# Patient Record
Sex: FEMALE | URBAN - METROPOLITAN AREA
[De-identification: names, ages, dates, MRNs, and addresses within clinical notes are randomized per-mention and may not be internally consistent; named-entity substitution may affect disease eponyms.]

---

## 2019-12-04 ENCOUNTER — EMERGENCY (EMERGENCY)
Facility: HOSPITAL | Age: 59
LOS: 1 days | Discharge: HOME | End: 2019-12-04
Attending: EMERGENCY MEDICINE | Admitting: EMERGENCY MEDICINE
Payer: SELF-PAY

## 2019-12-04 VITALS
OXYGEN SATURATION: 98 % | RESPIRATION RATE: 18 BRPM | TEMPERATURE: 99 F | SYSTOLIC BLOOD PRESSURE: 145 MMHG | HEART RATE: 74 BPM | DIASTOLIC BLOOD PRESSURE: 92 MMHG

## 2019-12-04 VITALS
RESPIRATION RATE: 18 BRPM | SYSTOLIC BLOOD PRESSURE: 152 MMHG | DIASTOLIC BLOOD PRESSURE: 109 MMHG | OXYGEN SATURATION: 99 % | TEMPERATURE: 99 F | HEART RATE: 85 BPM

## 2019-12-04 PROCEDURE — 99283 EMERGENCY DEPT VISIT LOW MDM: CPT

## 2019-12-04 PROCEDURE — 73060 X-RAY EXAM OF HUMERUS: CPT | Mod: 26,RT

## 2019-12-04 PROCEDURE — 73030 X-RAY EXAM OF SHOULDER: CPT | Mod: 26,RT

## 2019-12-04 RX ORDER — ACETAMINOPHEN 500 MG
975 TABLET ORAL ONCE
Refills: 0 | Status: COMPLETED | OUTPATIENT
Start: 2019-12-04 | End: 2019-12-04

## 2019-12-04 RX ORDER — CLONAZEPAM 1 MG
0 TABLET ORAL
Qty: 0 | Refills: 0 | DISCHARGE

## 2019-12-04 RX ORDER — VALSARTAN 80 MG/1
0 TABLET ORAL
Qty: 0 | Refills: 0 | DISCHARGE

## 2019-12-04 RX ORDER — METOPROLOL TARTRATE 50 MG
0 TABLET ORAL
Qty: 0 | Refills: 0 | DISCHARGE

## 2019-12-04 RX ADMIN — Medication 975 MILLIGRAM(S): at 12:38

## 2019-12-04 NOTE — ED ADULT NURSE NOTE - OBJECTIVE STATEMENT
A&OX3 c/o s/p trip and fall yesterday morning, reprots left arm pain denies cp sob n/v/d no redness or swelling noted to left arm, left radial pulse felt,

## 2019-12-04 NOTE — ED PROVIDER NOTE - PHYSICAL EXAMINATION
Neck - no c/vert tenderness of step-off.  No paravert TTP.  + FROM.    Normal clavicle exam.  + right should tenderness to palpation.  No obvious dislocation.  No swelling.  + pain with passive extension or abduction.    + right humerus tenderness at proximal aspect without palpable deformity.  Right elbow and forearm with normal exam, nontender.  2+ rad pulse.  Right wrist/hand normal.  Motor and sensory function fully intact.

## 2019-12-04 NOTE — ED PROVIDER NOTE - OBJECTIVE STATEMENT
58 y/o F s/p slip and fall at approx 11am yesterday. Reports fell forward and landed on RUE underneath her.  Denies head trauma or LOC.  No preceding lightheadedness.  Able to get up and ambulate.  Reports developed right shoulder and upper arm pain approx 1 hour after event, and has continued to increase in intensity.  Denies neck pain.  No chest pain or shortness of breath.  No headache, blurry vision, extremity weakness or numbness (besides limitation from RUE pain).  No abd pain.  No LE pain.  Applied warm water to area and took advil 400mg at 9:30am.

## 2019-12-04 NOTE — ED PROVIDER NOTE - PATIENT PORTAL LINK FT
You can access the FollowMyHealth Patient Portal offered by [facility] by registering at the following website: [email or web address]. By joining [email or web address]’s FollowMyHealth portal, you will also be able to view your health information using other applications (apps) compatible with our system.

## 2019-12-04 NOTE — ED PROVIDER NOTE - NSFOLLOWUPINSTRUCTIONS_ED_ALL_ED_FT
Follow up with Orthopedist within 1 week for re-evaluation.  Apply ice to area.  Use sling when walking or needed to assist with pain.  Return to ER immediately for increased pain or further concern.

## 2019-12-04 NOTE — ED ADULT TRIAGE NOTE - CHIEF COMPLAINT QUOTE
unable to lift right arm since fall denies any head injury pt ambulates with out assist sleeps sitting up 3-4 pillows

## 2019-12-04 NOTE — ED PROVIDER NOTE - PROGRESS NOTE DETAILS
Patient re-assessed.  Feels better post pain medication.  Xray results reviewed and discussed with patient.  Patient stable for discharge.  To continue to use sling.  Discussed need to remove from sling to avoid frozen shoulder.  Patient will f/u with an ortho MD in [place] (where she lives) who she has seen in past.

## 2022-08-05 NOTE — ED PROVIDER NOTE - NEURO NEGATIVE STATEMENT, MLM
no loss of consciousness, no gait abnormality, no headache, no sensory deficits, and no weakness. [Headache] : headache [Seasonal Allergies] : seasonal allergies [Negative] : Skin [Fever] : no fever [Fatigue] : no fatigue [Shortness Of Breath] : no shortness of breath [Cough] : no cough [Murmur] : no murmur [Chest Pain] : no chest pain [Joint Pain] : no joint pain [Joint Swelling] : no joint swelling [Dizziness] : no dizziness [Weakness] : no weakness [Rash] : no rash [Eczema] : no eczema

## 2023-01-19 NOTE — ED ADULT NURSE NOTE - CHIEF COMPLAINT QUOTE
unable to lift right arm since fall denies any head injury pt ambulates with out assist sleeps sitting up 3-4 pillows
Detail Level: Detailed
Continue Regimen: [place] to the chin as needed\\nSpironolactone 50mg po qd